# Patient Record
Sex: FEMALE | URBAN - METROPOLITAN AREA
[De-identification: names, ages, dates, MRNs, and addresses within clinical notes are randomized per-mention and may not be internally consistent; named-entity substitution may affect disease eponyms.]

---

## 2021-08-03 ENCOUNTER — OFFICE VISIT (OUTPATIENT)
Dept: URBAN - METROPOLITAN AREA SURGERY CENTER 18 | Facility: SURGERY CENTER | Age: 66
End: 2021-08-03

## 2021-08-17 ENCOUNTER — OFFICE VISIT (OUTPATIENT)
Dept: URBAN - METROPOLITAN AREA SURGERY CENTER 18 | Facility: SURGERY CENTER | Age: 66
End: 2021-08-17
Payer: COMMERCIAL

## 2021-08-17 DIAGNOSIS — Z12.11 COLON CANCER SCREENING: ICD-10-CM

## 2021-08-17 PROCEDURE — G0121 COLON CA SCRN NOT HI RSK IND: HCPCS | Performed by: INTERNAL MEDICINE

## 2021-08-17 PROCEDURE — G8907 PT DOC NO EVENTS ON DISCHARG: HCPCS | Performed by: INTERNAL MEDICINE

## 2021-11-18 ENCOUNTER — TELEPHONE ENCOUNTER (OUTPATIENT)
Dept: URBAN - METROPOLITAN AREA CLINIC 23 | Facility: CLINIC | Age: 66
End: 2021-11-18

## 2022-01-12 ENCOUNTER — OFFICE VISIT (OUTPATIENT)
Dept: URBAN - METROPOLITAN AREA CLINIC 98 | Facility: CLINIC | Age: 67
End: 2022-01-12

## 2023-08-28 ENCOUNTER — OFFICE VISIT (OUTPATIENT)
Dept: URBAN - METROPOLITAN AREA CLINIC 96 | Facility: CLINIC | Age: 68
End: 2023-08-28

## 2023-08-28 RX ORDER — LEVOTHYROXINE SODIUM 125 UG/1
TAKE ONE TABLET BY MOUTH ONE TIME DAILY ON AN EMPTY STOMACH TABLET ORAL
Qty: 90 UNSPECIFIED | Refills: 0 | Status: ACTIVE | COMMUNITY

## 2023-08-28 RX ORDER — ESTRADIOL 10 UG/1
INSERT 1 VAGINALLY TWICE A WEEK TABLET VAGINAL
Qty: 8 EACH | Refills: 0 | Status: ACTIVE | COMMUNITY

## 2023-08-28 RX ORDER — BENZONATATE 200 MG/1
TAKE 1 CAPSULE BY MOUTH THREE TIMES DAILY AS NEEDED FOR COUGH CAPSULE ORAL
Qty: 30 EACH | Refills: 0 | Status: ACTIVE | COMMUNITY

## 2023-08-28 RX ORDER — NIRMATRELVIR AND RITONAVIR 300-100 MG
TAKE 3 TABLET BY MOUTH TWICE DAILY FOR 5 DAYS. TAKE 2 PINK NIRMATRELVIR AND 1 WHITE RITNOAVIR TABLET BY MOUTH 2 TIMES A DAY FOR 5 DAYS KIT ORAL
Qty: 30 EACH | Refills: 0 | Status: ACTIVE | COMMUNITY

## 2023-08-28 RX ORDER — ESCITALOPRAM 20 MG/1
TAKE ONE AND ONE-HALF TABLETS BY MOUTH ONE TIME DAILY TABLET, FILM COATED ORAL
Qty: 135 UNSPECIFIED | Refills: 0 | Status: ACTIVE | COMMUNITY

## 2023-08-28 RX ORDER — ESTRADIOL 10 UG/1
INSERT 1 TABLET INTRAVAGINALLY TWICE A WEEK TABLET, FILM COATED VAGINAL
Qty: 24 UNSPECIFIED | Refills: 0 | Status: ACTIVE | COMMUNITY

## 2023-09-21 ENCOUNTER — LAB OUTSIDE AN ENCOUNTER (OUTPATIENT)
Dept: URBAN - METROPOLITAN AREA CLINIC 98 | Facility: CLINIC | Age: 68
End: 2023-09-21

## 2023-09-21 ENCOUNTER — OFFICE VISIT (OUTPATIENT)
Dept: URBAN - METROPOLITAN AREA CLINIC 98 | Facility: CLINIC | Age: 68
End: 2023-09-21
Payer: COMMERCIAL

## 2023-09-21 VITALS
HEART RATE: 77 BPM | TEMPERATURE: 97.6 F | BODY MASS INDEX: 27.58 KG/M2 | DIASTOLIC BLOOD PRESSURE: 97 MMHG | HEIGHT: 68 IN | WEIGHT: 182 LBS | SYSTOLIC BLOOD PRESSURE: 111 MMHG

## 2023-09-21 DIAGNOSIS — K30 INDIGESTION: ICD-10-CM

## 2023-09-21 DIAGNOSIS — R13.19 ESOPHAGEAL DYSPHAGIA: ICD-10-CM

## 2023-09-21 PROBLEM — 162031009: Status: ACTIVE | Noted: 2023-09-21

## 2023-09-21 PROCEDURE — 99214 OFFICE O/P EST MOD 30 MIN: CPT | Performed by: INTERNAL MEDICINE

## 2023-09-21 RX ORDER — ESCITALOPRAM 20 MG/1
TAKE ONE AND ONE-HALF TABLETS BY MOUTH ONE TIME DAILY TABLET, FILM COATED ORAL
Qty: 135 UNSPECIFIED | Refills: 0 | Status: ACTIVE | COMMUNITY

## 2023-09-21 RX ORDER — ESTRADIOL 10 UG/1
INSERT 1 TABLET INTRAVAGINALLY TWICE A WEEK TABLET, FILM COATED VAGINAL
Qty: 24 UNSPECIFIED | Refills: 0 | Status: ACTIVE | COMMUNITY

## 2023-09-21 RX ORDER — LEVOTHYROXINE SODIUM 125 UG/1
TAKE ONE TABLET BY MOUTH ONE TIME DAILY ON AN EMPTY STOMACH TABLET ORAL
Qty: 90 UNSPECIFIED | Refills: 0 | Status: ACTIVE | COMMUNITY

## 2023-09-21 NOTE — HPI-TODAY'S VISIT:
Ms. Erazo is a 67 yo F presenting for followup visit for esophageal dysphagia.  Began 8 months ago progressively worsening.  Scrambled eggs, fish, breads, chicken, potatoes really make it worse.  The food feels that it gets stuck, she drinks something carbonated and it helps push it down. She also has some indigestion improved with omeprazole 20 mg daily. Rare heartburn.  No unintentional weight loss.  No vomiting with this.   I reviewed:  8/17/21 colonoscopy: no polyps, next due in 2031

## 2023-09-23 LAB
DEAMIDATED GLIADIN ABS, IGA: 7
DEAMIDATED GLIADIN ABS, IGG: 3
ENDOMYSIAL ANTIBODY IGA: NEGATIVE
HEMATOCRIT: 42.3
HEMOGLOBIN: 13.9
IMMUNOGLOBULIN A, QN, SERUM: 279
MCH: 29
MCHC: 32.9
MCV: 88
NRBC: (no result)
PLATELETS: 324
RBC: 4.79
RDW: 12.5
T-TRANSGLUTAMINASE (TTG) IGA: <2
T-TRANSGLUTAMINASE (TTG) IGG: <2
WBC: 9

## 2023-09-26 ENCOUNTER — OFFICE VISIT (OUTPATIENT)
Dept: URBAN - METROPOLITAN AREA SURGERY CENTER 18 | Facility: SURGERY CENTER | Age: 68
End: 2023-09-26

## 2023-10-26 ENCOUNTER — OFFICE VISIT (OUTPATIENT)
Dept: URBAN - METROPOLITAN AREA SURGERY CENTER 18 | Facility: SURGERY CENTER | Age: 68
End: 2023-10-26

## 2023-11-22 ENCOUNTER — OFFICE VISIT (OUTPATIENT)
Dept: URBAN - METROPOLITAN AREA SURGERY CENTER 18 | Facility: SURGERY CENTER | Age: 68
End: 2023-11-22

## 2023-12-27 ENCOUNTER — OFFICE VISIT (OUTPATIENT)
Dept: URBAN - METROPOLITAN AREA SURGERY CENTER 18 | Facility: SURGERY CENTER | Age: 68
End: 2023-12-27

## 2024-01-30 ENCOUNTER — CLAIMS CREATED FROM THE CLAIM WINDOW (OUTPATIENT)
Dept: URBAN - METROPOLITAN AREA CLINIC 4 | Facility: CLINIC | Age: 69
End: 2024-01-30
Payer: COMMERCIAL

## 2024-01-30 ENCOUNTER — OUT OF OFFICE VISIT (OUTPATIENT)
Dept: URBAN - METROPOLITAN AREA SURGERY CENTER 18 | Facility: SURGERY CENTER | Age: 69
End: 2024-01-30
Payer: COMMERCIAL

## 2024-01-30 DIAGNOSIS — R13.10 DYSPHAGIA: ICD-10-CM

## 2024-01-30 DIAGNOSIS — K21.9 ACID REFLUX: ICD-10-CM

## 2024-01-30 DIAGNOSIS — R13.19 ESOPHAGEAL DYSPHAGIA: ICD-10-CM

## 2024-01-30 DIAGNOSIS — K31.A0 GASTRIC INTESTINAL METAPLASIA, UNSPECIFIED: ICD-10-CM

## 2024-01-30 DIAGNOSIS — K31.89 OTHER DISEASES OF STOMACH AND DUODENUM: ICD-10-CM

## 2024-01-30 DIAGNOSIS — R10.13 ABDOMINAL DISCOMFORT, EPIGASTRIC: ICD-10-CM

## 2024-01-30 DIAGNOSIS — K29.60 OTHER GASTRITIS WITHOUT BLEEDING: ICD-10-CM

## 2024-01-30 DIAGNOSIS — K21.9 GASTRO-ESOPHAGEAL REFLUX DISEASE WITHOUT ESOPHAGITIS: ICD-10-CM

## 2024-01-30 DIAGNOSIS — K30 INDIGESTION: ICD-10-CM

## 2024-01-30 PROCEDURE — 88342 IMHCHEM/IMCYTCHM 1ST ANTB: CPT | Performed by: PATHOLOGY

## 2024-01-30 PROCEDURE — G8907 PT DOC NO EVENTS ON DISCHARG: HCPCS | Performed by: INTERNAL MEDICINE

## 2024-01-30 PROCEDURE — 88312 SPECIAL STAINS GROUP 1: CPT | Performed by: PATHOLOGY

## 2024-01-30 PROCEDURE — 00731 ANES UPR GI NDSC PX NOS: CPT | Performed by: NURSE ANESTHETIST, CERTIFIED REGISTERED

## 2024-01-30 PROCEDURE — 43239 EGD BIOPSY SINGLE/MULTIPLE: CPT | Performed by: INTERNAL MEDICINE

## 2024-01-30 PROCEDURE — 88341 IMHCHEM/IMCYTCHM EA ADD ANTB: CPT | Performed by: PATHOLOGY

## 2024-01-30 PROCEDURE — 88305 TISSUE EXAM BY PATHOLOGIST: CPT | Performed by: PATHOLOGY

## 2024-01-30 RX ORDER — ESCITALOPRAM 20 MG/1
TAKE ONE AND ONE-HALF TABLETS BY MOUTH ONE TIME DAILY TABLET, FILM COATED ORAL
Qty: 135 UNSPECIFIED | Refills: 0 | Status: ACTIVE | COMMUNITY

## 2024-01-30 RX ORDER — LEVOTHYROXINE SODIUM 125 UG/1
TAKE ONE TABLET BY MOUTH ONE TIME DAILY ON AN EMPTY STOMACH TABLET ORAL
Qty: 90 UNSPECIFIED | Refills: 0 | Status: ACTIVE | COMMUNITY

## 2024-01-30 RX ORDER — ESTRADIOL 10 UG/1
INSERT 1 TABLET INTRAVAGINALLY TWICE A WEEK TABLET, FILM COATED VAGINAL
Qty: 24 UNSPECIFIED | Refills: 0 | Status: ACTIVE | COMMUNITY

## 2024-03-27 ENCOUNTER — OV EP (OUTPATIENT)
Dept: URBAN - METROPOLITAN AREA CLINIC 98 | Facility: CLINIC | Age: 69
End: 2024-03-27
Payer: COMMERCIAL

## 2024-03-27 ENCOUNTER — LAB (OUTPATIENT)
Dept: URBAN - METROPOLITAN AREA CLINIC 98 | Facility: CLINIC | Age: 69
End: 2024-03-27

## 2024-03-27 VITALS
SYSTOLIC BLOOD PRESSURE: 122 MMHG | TEMPERATURE: 97.2 F | DIASTOLIC BLOOD PRESSURE: 83 MMHG | WEIGHT: 185 LBS | BODY MASS INDEX: 28.04 KG/M2 | HEIGHT: 68 IN | HEART RATE: 83 BPM

## 2024-03-27 DIAGNOSIS — K30 INDIGESTION: ICD-10-CM

## 2024-03-27 DIAGNOSIS — R13.19 ESOPHAGEAL DYSPHAGIA: ICD-10-CM

## 2024-03-27 PROCEDURE — 99214 OFFICE O/P EST MOD 30 MIN: CPT | Performed by: INTERNAL MEDICINE

## 2024-03-27 RX ORDER — ESTRADIOL 10 UG/1
INSERT 1 TABLET INTRAVAGINALLY TWICE A WEEK TABLET, FILM COATED VAGINAL
Qty: 24 UNSPECIFIED | Refills: 0 | Status: ACTIVE | COMMUNITY

## 2024-03-27 RX ORDER — ESCITALOPRAM 20 MG/1
TAKE ONE AND ONE-HALF TABLETS BY MOUTH ONE TIME DAILY TABLET, FILM COATED ORAL
Qty: 135 UNSPECIFIED | Refills: 0 | Status: ACTIVE | COMMUNITY

## 2024-03-27 RX ORDER — LEVOTHYROXINE SODIUM 125 UG/1
TAKE ONE TABLET BY MOUTH ONE TIME DAILY ON AN EMPTY STOMACH TABLET ORAL
Qty: 90 UNSPECIFIED | Refills: 0 | Status: ACTIVE | COMMUNITY

## 2024-03-27 NOTE — HPI-TODAY'S VISIT:
Ms. Erazo is a 69 yo F presenting for followup visit for esophageal dysphagia. Last visit on 9/21/23 Still having dysphagia, having to chew more thoroughly, staying away from eggs, corn, bread, chicken.  Concern for esophageal spasm with occasional chest pain after eating.  Seeing cardiologist who believes this is not cardiac, per patient. Tums treats the pain well.  No unintentional weight loss, no vomiting.    I reviewed:  1/30/24 EGD: biopsies taken throughout 1/30/24 EGD path: inactive gastritis, possible treated H pylori 8/17/21 colonoscopy: no polyps, next due in 2031

## 2024-03-28 LAB — H PYLORI BREATH TEST: NEGATIVE

## 2024-06-27 ENCOUNTER — DASHBOARD ENCOUNTERS (OUTPATIENT)
Age: 69
End: 2024-06-27

## 2024-06-27 ENCOUNTER — OFFICE VISIT (OUTPATIENT)
Dept: URBAN - METROPOLITAN AREA CLINIC 98 | Facility: CLINIC | Age: 69
End: 2024-06-27
Payer: COMMERCIAL

## 2024-06-27 VITALS
TEMPERATURE: 97.3 F | SYSTOLIC BLOOD PRESSURE: 103 MMHG | BODY MASS INDEX: 28.04 KG/M2 | HEIGHT: 68 IN | DIASTOLIC BLOOD PRESSURE: 71 MMHG | WEIGHT: 185 LBS | HEART RATE: 79 BPM

## 2024-06-27 DIAGNOSIS — K44.9 HIATAL HERNIA: ICD-10-CM

## 2024-06-27 DIAGNOSIS — R13.19 ESOPHAGEAL DYSPHAGIA: ICD-10-CM

## 2024-06-27 DIAGNOSIS — K30 INDIGESTION: ICD-10-CM

## 2024-06-27 PROCEDURE — 99214 OFFICE O/P EST MOD 30 MIN: CPT | Performed by: INTERNAL MEDICINE

## 2024-06-27 RX ORDER — LEVOTHYROXINE SODIUM 125 UG/1
TAKE ONE TABLET BY MOUTH ONE TIME DAILY ON AN EMPTY STOMACH TABLET ORAL
Qty: 90 UNSPECIFIED | Refills: 0 | Status: ACTIVE | COMMUNITY

## 2024-06-27 RX ORDER — ESTRADIOL 10 UG/1
INSERT 1 TABLET INTRAVAGINALLY TWICE A WEEK TABLET, FILM COATED VAGINAL
Qty: 24 UNSPECIFIED | Refills: 0 | Status: ACTIVE | COMMUNITY

## 2024-06-27 RX ORDER — OMEPRAZOLE 20 MG/1
1 CAPSULE 30 MINUTES BEFORE MORNING MEAL CAPSULE, DELAYED RELEASE ORAL ONCE A DAY
Qty: 30 | Refills: 3 | OUTPATIENT
Start: 2024-06-27

## 2024-06-27 RX ORDER — ESCITALOPRAM 20 MG/1
TAKE ONE AND ONE-HALF TABLETS BY MOUTH ONE TIME DAILY TABLET, FILM COATED ORAL
Qty: 135 UNSPECIFIED | Refills: 0 | Status: ACTIVE | COMMUNITY

## 2024-06-27 NOTE — HPI-TODAY'S VISIT:
Ms. Erazo is a 69 yo F presenting for followup visit for esophageal dysphagia. Last visit on 3/27/24.  She is now only eating whole foods.  Drinking mostly water and she is off gluten.  She has not had any more difficulty swallowing.  Feels her throat is not inflamed. Has not been feeling any acid reflux recently.  She says the PPI worked well when she was on it.  No unintentional weight loss or vomiting.   I reviewed:  4/12/24 barium swallow: med hiatal hernia, severe reflux, tertiary contractions 1/30/24 EGD: biopsies taken throughout 1/30/24 EGD path: inactive gastritis, possible treated H pylori 8/17/21 colonoscopy: no polyps, next due in 2031

## 2025-04-02 ENCOUNTER — ERX REFILL RESPONSE (OUTPATIENT)
Dept: URBAN - METROPOLITAN AREA CLINIC 98 | Facility: CLINIC | Age: 70
End: 2025-04-02

## 2025-04-02 RX ORDER — OMEPRAZOLE 20 MG/1
1 CAPSULE 30 MINUTES BEFORE MORNING MEAL CAPSULE, DELAYED RELEASE ORAL ONCE A DAY
Qty: 30 | Refills: 3